# Patient Record
Sex: MALE | Race: WHITE | NOT HISPANIC OR LATINO | Employment: FULL TIME | URBAN - METROPOLITAN AREA
[De-identification: names, ages, dates, MRNs, and addresses within clinical notes are randomized per-mention and may not be internally consistent; named-entity substitution may affect disease eponyms.]

---

## 2020-09-29 ENCOUNTER — HOSPITAL ENCOUNTER (EMERGENCY)
Facility: HOSPITAL | Age: 40
Discharge: HOME | End: 2020-09-29
Attending: EMERGENCY MEDICINE
Payer: OTHER GOVERNMENT

## 2020-09-29 VITALS
TEMPERATURE: 97 F | RESPIRATION RATE: 18 BRPM | WEIGHT: 170 LBS | BODY MASS INDEX: 23.03 KG/M2 | SYSTOLIC BLOOD PRESSURE: 130 MMHG | DIASTOLIC BLOOD PRESSURE: 79 MMHG | HEIGHT: 72 IN | HEART RATE: 60 BPM | OXYGEN SATURATION: 100 %

## 2020-09-29 DIAGNOSIS — R55 VASOVAGAL SYNCOPE: Primary | ICD-10-CM

## 2020-09-29 DIAGNOSIS — S00.03XA CONTUSION OF SCALP, INITIAL ENCOUNTER: ICD-10-CM

## 2020-09-29 DIAGNOSIS — R55 SYNCOPE: ICD-10-CM

## 2020-09-29 LAB — POCT GLUCOSE: 139 MG/DL (ref 70–110)

## 2020-09-29 PROCEDURE — 99284 EMERGENCY DEPT VISIT MOD MDM: CPT | Mod: 25

## 2020-09-29 PROCEDURE — 93010 ELECTROCARDIOGRAM REPORT: CPT | Mod: ,,, | Performed by: INTERNAL MEDICINE

## 2020-09-29 PROCEDURE — 93005 ELECTROCARDIOGRAM TRACING: CPT

## 2020-09-29 PROCEDURE — 82962 GLUCOSE BLOOD TEST: CPT

## 2020-09-29 PROCEDURE — 93010 EKG 12-LEAD: ICD-10-PCS | Mod: ,,, | Performed by: INTERNAL MEDICINE

## 2020-09-29 NOTE — ED TRIAGE NOTES
PT reports having a dental procedure this am at 11am. After the procedure pt had a syncopal episode when standing. Hit head on floor AAOx4 at this time. No lacerations or bleeding noted.

## 2020-09-29 NOTE — ED PROVIDER NOTES
Encounter Date: 9/29/2020    SCRIBE #1 NOTE: I, Twan Ghosh, am scribing for, and in the presence of, Rex Braga MD.       History     Chief Complaint   Patient presents with    Loss of Consciousness     at dentist office had a procedure and when standing up LOC PTA 1 hr     Florian Bhatt is a 40 year old male who presents to the ED with an onset of a syncopal episode two hours ago following a dental implant procedure. Patient states that he felt light-headed after the procedure, which he often does, and lost consciousness while standing for an X-ray. He struck the back of his head when landing. This resulted in a possible hematoma to the back of the head. While he did experience a headache afterwards, it has resolved. Patient suspects a combination of history losing consciousness post-procedures and not eating today. Denies any diaphoresis, burry vision, chest pain, palpitations, shortness of breath, nausea, neck pain, numbness, or weakness occurring before or after the event. No history of hypoglycemic episodes. Denies alcohol, tobacco, or other drug use. No social history of recent sickness or travel.    The history is provided by the patient.     Review of patient's allergies indicates:  No Known Allergies  History reviewed. No pertinent past medical history.  History reviewed. No pertinent surgical history.  History reviewed. No pertinent family history.  Social History     Tobacco Use    Smoking status: Never Smoker   Substance Use Topics    Alcohol use: Not Currently    Drug use: Never     Review of Systems   Constitutional: Negative for diaphoresis and fever.   HENT: Negative for sore throat.    Eyes: Negative for visual disturbance.   Respiratory: Negative for shortness of breath.    Cardiovascular: Negative for chest pain and palpitations.   Gastrointestinal: Negative for abdominal pain and nausea.   Genitourinary: Negative for dysuria.   Musculoskeletal: Negative for back pain and neck pain.    Skin: Positive for wound. Negative for rash.   Neurological: Positive for syncope, light-headedness and headaches. Negative for weakness and numbness.       Physical Exam     Initial Vitals [09/29/20 1222]   BP Pulse Resp Temp SpO2   136/83 (!) 52 18 97.4 °F (36.3 °C) 100 %      MAP       --         Physical Exam    Nursing note and vitals reviewed.  Constitutional: He appears well-developed and well-nourished. He is not diaphoretic. No distress.   HENT:   Head: Normocephalic. Head is with abrasion. Head is without raccoon's eyes, without Martin's sign and without laceration.   Small hematoma to the back of the head. No bony crepitus or step-off deformity. To tenderness to palpation of the skull. There are mild abrasions to the bilateral posterior parietal regions, most likely secondary to the X-ray head clamps.   Eyes: EOM are normal. Pupils are equal, round, and reactive to light.   Neck: Normal range of motion. Neck supple. No thyromegaly present. No JVD present.   Cardiovascular: Normal rate, regular rhythm, normal heart sounds and intact distal pulses. Exam reveals no gallop and no friction rub.    No murmur heard.  Pulmonary/Chest: Breath sounds normal. No respiratory distress.   Abdominal: Soft. Bowel sounds are normal. There is no abdominal tenderness.   Musculoskeletal: Normal range of motion. No tenderness or edema.      Right shoulder: Normal.      Left shoulder: Normal.      Right elbow: Normal.     Left elbow: Normal.      Right wrist: Normal.      Left wrist: Normal.      Right hip: Normal.      Left hip: Normal.      Right knee: He exhibits normal range of motion, no effusion and no ecchymosis.      Left knee: Normal.        Right ankle: Normal.        Left ankle: Normal.      Cervical back: Normal.      Thoracic back: Normal.      Lumbar back: Normal.   Neurological: He is alert and oriented to person, place, and time. He has normal strength. GCS score is 15. GCS eye subscore is 4. GCS verbal  subscore is 5. GCS motor subscore is 6.   Skin: Skin is warm and dry. Capillary refill takes less than 2 seconds. Abrasion noted.   Minor superficial abrasion over the right knee.         ED Course   Procedures  Labs Reviewed   POCT GLUCOSE - Abnormal; Notable for the following components:       Result Value    POCT Glucose 139 (*)     All other components within normal limits   POCT GLUCOSE MONITORING CONTINUOUS          Imaging Results    None          Medical Decision Making:   History:   Old Medical Records: I decided to obtain old medical records.  Initial Assessment:   40 year old male presents to the ED with an onset of light-headedness resulting in syncope and subsequent fall. Physical exam as described. Ordered orthostatics and POCT glucose.  Clinical Tests:   Lab Tests: Ordered and Reviewed      patient became lightheaded upon standing after having a dental implant.  States he has had few episodes in the past or similar after medical procedures.  No chest pain, shortness of breath, palpitations.  He did strike the back of his head.  No headache.  No blurred vision.  No nausea.  Otherwise normal exam except for few minor abrasions and the occipital scalp contusion.  Blood sugar of 139.  He drink some orange juice prior to coming in. Patient has no medical problems.  He EKG is normal.  Patient is not orthostatic.  Likely vasovagal syncope.  The fact that this happened before under similar circumstances no further workup needed this time.  Minor head injury precautions given.  Stable for discharge.       Scribe Attestation:   Scribe #1: I performed the above scribed service and the documentation accurately describes the services I performed. I attest to the accuracy of the note.                      Clinical Impression:       ICD-10-CM ICD-9-CM   1. Vasovagal syncope  R55 780.2   2. Syncope  R55 780.2   3. Contusion of scalp, initial encounter  S00.03XA [id number]                      Disposition:   Disposition:  Discharged  Condition: Stable     ED Disposition Condition    Discharge Stable        ED Prescriptions     None        Follow-up Information     Follow up With Specialties Details Why Contact Info        follow up with your PCP    [facility]  As needed if new symptoms develop [address] [place] [id number] [phone]                      I, Rex Braga, personally performed the services described in this documentation. All medical record entries made by the scribe were at my direction and in my presence. I have reviewed the chart and agree that the record reflects my personal performance and is accurate and complete.                 Rex Braga MD  09/29/20 [id number]

## 2020-09-29 NOTE — FIRST PROVIDER EVALUATION
Emergency Department TeleTriage Encounter Note      CHIEF COMPLAINT    Chief Complaint   Patient presents with    Loss of Consciousness     at dentist office had a procedure and when standing up LOC PTA 1 hr       VITAL SIGNS   Initial Vitals [09/29/20 1222]   BP Pulse Resp Temp SpO2   136/83 (!) 52 18 97.4 °F (36.3 °C) 100 %      MAP       --            ALLERGIES    Review of patient's allergies indicates:  No Known Allergies    PROVIDER TRIAGE NOTE  41 y/o male which presents with an episode of syncope after having a dental procedure. Pt states he hit is head when he had the syncopal episode. He is feeling fine now but the dentist wanted him to be evaluated.       ORDERS  Labs Reviewed - No data to display    ED Orders (720h ago, onward)    None            Virtual Visit Note: The provider triage portion of this emergency department evaluation and documentation was performed via [employer], a HIPAA-compliant telemedicine application, in concert with a tele-presenter in the room. A face to face patient evaluation with one of my colleagues will occur once the patient is placed in an emergency department room.      DISCLAIMER: This note was prepared with [employer] voice recognition transcription software. Garbled syntax, mangled pronouns, and other bizarre constructions may be attributed to that software system.